# Patient Record
Sex: FEMALE
[De-identification: names, ages, dates, MRNs, and addresses within clinical notes are randomized per-mention and may not be internally consistent; named-entity substitution may affect disease eponyms.]

---

## 2021-03-16 PROBLEM — Z00.00 ENCOUNTER FOR PREVENTIVE HEALTH EXAMINATION: Status: ACTIVE | Noted: 2021-03-16

## 2021-03-19 ENCOUNTER — APPOINTMENT (OUTPATIENT)
Dept: SURGICAL ONCOLOGY | Facility: CLINIC | Age: 61
End: 2021-03-19
Payer: COMMERCIAL

## 2021-03-19 DIAGNOSIS — Z80.0 FAMILY HISTORY OF MALIGNANT NEOPLASM OF DIGESTIVE ORGANS: ICD-10-CM

## 2021-03-19 DIAGNOSIS — J38.1 POLYP OF VOCAL CORD AND LARYNX: ICD-10-CM

## 2021-03-19 PROCEDURE — 99202 OFFICE O/P NEW SF 15 MIN: CPT | Mod: 95

## 2021-03-24 PROBLEM — J38.1 VOCAL CORD POLYP: Status: RESOLVED | Noted: 2021-03-24 | Resolved: 2021-03-24

## 2021-03-24 PROBLEM — Z80.0 FAMILY HISTORY OF MALIGNANT NEOPLASM OF COLON: Status: ACTIVE | Noted: 2021-03-24

## 2021-03-24 RX ORDER — APIXABAN 5 MG/1
5 TABLET, FILM COATED ORAL
Refills: 0 | Status: ACTIVE | COMMUNITY

## 2021-03-24 RX ORDER — PANCRELIPASE 120000; 24000; 76000 [USP'U]/1; [USP'U]/1; [USP'U]/1
24000-76000 CAPSULE, DELAYED RELEASE PELLETS ORAL
Refills: 0 | Status: ACTIVE | COMMUNITY

## 2021-04-02 NOTE — HISTORY OF PRESENT ILLNESS
[Abdominal Pain] : abdominal pain [EUS] : EUS [Biopsy] : biopsy performed [History of Neoadjuvant Therapy] : Patient has history of neoadjuvant therapy [Neoadjuvant Chemotherapy] : Patient had neoadjuvant chemotherapy [5-FU based] : Type of 1st Neoadjuvant chemotherapy: 5-FU based [Switch of neoadjuvant therapy] : Neoadjuvant therapy has been switched [Progression of disease] : progression of disease [[place]-based] : Type of 2nd Neoadjuvant chemotherapy: [place]-based [de-identified] : This visit was provided via telehealth using real-time 2-way audio visual technology. The patient, MAYUR DO, was located at home [address] [place] [address] at the time of the visit. This provider was located at the clinic in [place], [place] at the time of the visit. The provider, patient participated in the telehealth encounter.  Verbal consent was given 03/19/2021 by the patient. \par \par Ms. MAYUR DO is a 61 year old female who presents today for an initial consultation via telehealth. 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 Started on FFX 1/14/21. Tolerated it well. She underwent a restaging CT on 3/3/21 after her 4th cycle of FFX that showed an increase in size of her pancreas mass as well as an increase in size and number of hepatic mets.  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  FFX was discontinued and started on [place]/Abraxane 3/11/21. She underwent genetic and genomic testing and there were no target able mutations per the patient. She is tolerating the [place]/Abraxane and doing well with no complaints.  She presents for a second opinion.  [TextBox_4] : 12/1/20 [TextBox_21] : 184 [TextBox_23] : 184 [TextBox_41] : liver- metastatic adenocarcinoma [TextBox_43] : 12/22/20 [TextBox_12] : FFX [TextBox_16] : 1/14/21 [TextBox_35] : [place]/Abraxane [TextBox_18] : 3/11/21 [TextBox_39] : 3/11/21

## 2021-04-02 NOTE — ASSESSMENT
[FreeTextEntry1] : Ms. MAYUR DO is a 61 year old female who presents today for an initial consultation via telehealth. 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 Started on FFX 1/14/21 and underwent a restaging CT on 3/3/21 after her 4th cycle of FFX.  I reviewed her imaging and agree that it shows an increase in size of her pancreas mass as well as an increase in size and number of hepatic mets consistent with progression of disease. I agree with the plan to switch to [place]/Abraxane which she is tolerating well.  Discussed that i have a number of patients that progress on FFX but have great responses to [place] based chemotherapy agents.  She inquired about the likelihood of surgery in the future. Discussed that the liver disease is quite extensive in both lobes of the liver. The chance of cure is quite low but i have a number of patients who continue on chemotherapy with good quality of life.  She underwent genetic and genomic testing and unfortunately there were no targetable mutations, discussed that very few pancreas cancers have these mutations. She is tolerating the [place]/Abraxane and doing well with no complaints.  I recommend she continue on this treatment and should get a restaging scan with her medical oncologist.  She is in agreement, i am happy to weigh in in the future if she requests. \par \par Today, I personally spent 30 minutes in direct face to face time with the patient via telehealth, of which greater than 50% of the time was spent in patient education and counseling as described above.\par

## 2021-09-17 ENCOUNTER — APPOINTMENT (OUTPATIENT)
Dept: SURGICAL ONCOLOGY | Facility: CLINIC | Age: 61
End: 2021-09-17
Payer: COMMERCIAL

## 2021-09-17 DIAGNOSIS — C78.7 MALIGNANT NEOPLASM OF PANCREAS, UNSPECIFIED: ICD-10-CM

## 2021-09-17 DIAGNOSIS — C25.9 MALIGNANT NEOPLASM OF PANCREAS, UNSPECIFIED: ICD-10-CM

## 2021-09-17 PROCEDURE — 99242 OFF/OP CONSLTJ NEW/EST SF 20: CPT | Mod: GT

## 2021-09-30 PROBLEM — C25.9 MALIGNANT NEOPLASM OF PANCREAS METASTATIC TO LIVER: Status: ACTIVE | Noted: 2021-03-24

## 2021-10-01 NOTE — ASSESSMENT
[FreeTextEntry1] : Ms. MAYUR DO is a 61 year old female who presents today for a followup via telehealth. 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 Started on FFX 1/14/21. Tolerated it well. She underwent a restaging CT on 3/3/21 after her 4th cycle of FFX that showed an increase in size of her pancreas mass as well as an increase in size and number of hepatic mets.  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  FFX was discontinued and started on [place]/Abraxane 3/11/21. She underwent genetic and genomic testing and there were no target able mutations per the patient. She is tolerating the [place]/Abraxane and doing well with no complaints.  Underwent a CT 8/19/21 that shows improved pancreatic head mass, improved hepatic mets, no new/progressive metastases. \par \par Follow up PRN\par \par Today, I personally spent 30 minutes in total time including reviewing imaging and studies, discussing complex treatment regimens, direct face to face time with the patient, patient education and counseling.\par

## 2021-10-01 NOTE — HISTORY OF PRESENT ILLNESS
[de-identified] : This visit was provided via telehealth using real-time 2-way audio visual technology. The patient, MAYUR DO, was located at home [address] [place] [address] at the time of the visit. This provider was located at the clinic in [place], [place] at the time of the visit. The provider, patient participated in the telehealth encounter.  Verbal consent was given 03/19/2021 by the patient. \par \par Ms. MAYUR DO is a 61 year old female who presents today for a followup via telehealth. 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 Started on FFX 1/14/21. Tolerated it well. She underwent a restaging CT on 3/3/21 after her 4th cycle of FFX that showed an increase in size of her pancreas mass as well as an increase in size and number of hepatic mets.  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  FFX was discontinued and started on [place]/Abraxane 3/11/21. She underwent genetic and genomic testing and there were no target able mutations per the patient. She is tolerating the [place]/Abraxane and doing well with no complaints.  Underwent a CT 8/19/21 and presents to discuss.